# Patient Record
Sex: FEMALE | Employment: OTHER | URBAN - METROPOLITAN AREA
[De-identification: names, ages, dates, MRNs, and addresses within clinical notes are randomized per-mention and may not be internally consistent; named-entity substitution may affect disease eponyms.]

---

## 2023-11-15 LAB
NON-UH HIE A/G RATIO: 1.3
NON-UH HIE ALB: 3.8 G/DL (ref 3.4–5)
NON-UH HIE ALK PHOS: 78 UNIT/L (ref 45–117)
NON-UH HIE BASO COUNT: 0.06 X1000 (ref 0–0.2)
NON-UH HIE BASOS %: 1 %
NON-UH HIE BILIRUBIN, TOTAL: 0.5 MG/DL (ref 0.3–1.2)
NON-UH HIE BUN/CREAT RATIO: 22.5
NON-UH HIE BUN: 18 MG/DL (ref 9–23)
NON-UH HIE CALCIUM: 9.4 MG/DL (ref 8.7–10.4)
NON-UH HIE CALCULATED LDL CHOLESTEROL: 82 MG/DL (ref 60–130)
NON-UH HIE CALCULATED OSMOLALITY: 283 MOSM/KG (ref 275–295)
NON-UH HIE CHLORIDE: 109 MMOL/L (ref 98–107)
NON-UH HIE CHOLESTEROL: 189 MG/DL (ref 100–200)
NON-UH HIE CO2, VENOUS: 30 MMOL/L (ref 20–31)
NON-UH HIE CREATININE: 0.8 MG/DL (ref 0.5–0.8)
NON-UH HIE DIFF?: NO
NON-UH HIE EOS COUNT: 0.18 X1000 (ref 0–0.5)
NON-UH HIE EOSIN %: 2.6 %
NON-UH HIE GFR AA: >60
NON-UH HIE GLOBULIN: 3 G/DL
NON-UH HIE GLOMERULAR FILTRATION RATE: >60 ML/MIN/1.73M?
NON-UH HIE GLUCOSE: 91 MG/DL (ref 74–106)
NON-UH HIE GOT: 22 UNIT/L (ref 15–37)
NON-UH HIE GPT: 14 UNIT/L (ref 10–49)
NON-UH HIE HCT: 40.6 % (ref 36–46)
NON-UH HIE HDL CHOLESTEROL: 91 MG/DL (ref 40–60)
NON-UH HIE HGB A1C: 5.2 %
NON-UH HIE HGB: 13.6 G/DL (ref 12–16)
NON-UH HIE INSTR WBC: 6.6
NON-UH HIE K: 4.9 MMOL/L (ref 3.5–5.1)
NON-UH HIE LYMPH %: 23.6 %
NON-UH HIE LYMPH COUNT: 1.57 X1000 (ref 1.2–4.8)
NON-UH HIE MCH: 29.2 PG (ref 27–34)
NON-UH HIE MCHC: 33.4 G/DL (ref 32–37)
NON-UH HIE MCV: 87.4 FL (ref 80–100)
NON-UH HIE MONO %: 9.8 %
NON-UH HIE MONO COUNT: 0.65 X1000 (ref 0.1–1)
NON-UH HIE MPV: 8.3 FL (ref 7.4–10.4)
NON-UH HIE NA: 141 MMOL/L (ref 135–145)
NON-UH HIE NEUTROPHIL %: 63.1 %
NON-UH HIE NEUTROPHIL COUNT (ANC): 4.19 X1000 (ref 1.4–8.8)
NON-UH HIE NUCLEATED RBC: 0 /100WBC
NON-UH HIE PLATELET: 292 X10 (ref 150–450)
NON-UH HIE RBC: 4.65 X10 (ref 4.2–5.4)
NON-UH HIE RDW: 13.7 % (ref 11.5–14.5)
NON-UH HIE TOTAL CHOL/HDL CHOL RATIO: 2.1
NON-UH HIE TOTAL PROTEIN: 6.8 G/DL (ref 5.7–8.2)
NON-UH HIE TRIGLYCERIDES: 80 MG/DL (ref 30–150)
NON-UH HIE TSH: 2.78 UIU/ML (ref 0.55–4.78)
NON-UH HIE VIT D 25: 31 NG/ML
NON-UH HIE WBC: 6.6 X10 (ref 4.5–11)

## 2023-11-17 PROBLEM — Q44.1 GALLBLADDER ANOMALY: Status: ACTIVE | Noted: 2023-11-17

## 2023-11-17 PROBLEM — E03.9 HYPOTHYROIDISM: Status: ACTIVE | Noted: 2023-11-17

## 2023-11-17 PROBLEM — R01.1 MURMUR: Status: ACTIVE | Noted: 2023-11-17

## 2023-11-17 PROBLEM — E78.5 HYPERLIPIDEMIA: Status: ACTIVE | Noted: 2023-11-17

## 2023-11-17 PROBLEM — R06.83 SNORING: Status: ACTIVE | Noted: 2023-11-17

## 2023-11-17 PROBLEM — D23.61 OTHER BENIGN NEOPLASM OF SKIN OF RIGHT UPPER LIMB, INCLUDING SHOULDER: Status: ACTIVE | Noted: 2022-09-27

## 2023-11-17 PROBLEM — G47.30 SLEEP APNEA: Status: ACTIVE | Noted: 2023-11-17

## 2023-11-17 PROBLEM — R00.1 BRADYCARDIA: Status: ACTIVE | Noted: 2023-11-17

## 2023-11-17 PROBLEM — E66.9 OBESITY: Status: ACTIVE | Noted: 2023-11-17

## 2023-11-17 PROBLEM — M19.90 OSTEOARTHRITIS: Status: ACTIVE | Noted: 2023-11-17

## 2023-11-17 PROBLEM — E88.810 METABOLIC SYNDROME X: Status: ACTIVE | Noted: 2023-11-17

## 2023-11-17 PROBLEM — K21.9 GERD (GASTROESOPHAGEAL REFLUX DISEASE): Status: ACTIVE | Noted: 2023-11-17

## 2023-11-17 PROBLEM — L30.9 DERMATITIS, UNSPECIFIED: Status: ACTIVE | Noted: 2022-09-27

## 2023-11-17 PROBLEM — E55.9 VITAMIN D DEFICIENCY: Status: ACTIVE | Noted: 2023-11-17

## 2023-11-17 RX ORDER — TRIAMCINOLONE ACETONIDE 1 MG/G
1 CREAM TOPICAL 2 TIMES DAILY
COMMUNITY
Start: 2022-09-27 | End: 2023-11-21 | Stop reason: ALTCHOICE

## 2023-11-17 RX ORDER — LEVOTHYROXINE SODIUM 50 UG/1
1 TABLET ORAL DAILY
COMMUNITY

## 2023-11-17 RX ORDER — VIT C/E/ZN/COPPR/LUTEIN/ZEAXAN 250MG-90MG
25 CAPSULE ORAL DAILY
COMMUNITY

## 2023-11-17 RX ORDER — PANTOPRAZOLE SODIUM 40 MG/1
1 TABLET, DELAYED RELEASE ORAL DAILY
COMMUNITY

## 2023-11-17 RX ORDER — ROSUVASTATIN CALCIUM 5 MG/1
1 TABLET, COATED ORAL NIGHTLY
COMMUNITY
Start: 2022-04-05

## 2023-11-21 ENCOUNTER — OFFICE VISIT (OUTPATIENT)
Dept: PRIMARY CARE | Facility: CLINIC | Age: 73
End: 2023-11-21
Payer: MEDICARE

## 2023-11-21 VITALS
DIASTOLIC BLOOD PRESSURE: 90 MMHG | TEMPERATURE: 96.4 F | HEART RATE: 71 BPM | BODY MASS INDEX: 37.03 KG/M2 | WEIGHT: 209 LBS | HEIGHT: 63 IN | SYSTOLIC BLOOD PRESSURE: 166 MMHG

## 2023-11-21 DIAGNOSIS — Z12.31 SCREENING MAMMOGRAM FOR BREAST CANCER: ICD-10-CM

## 2023-11-21 DIAGNOSIS — E88.810 METABOLIC SYNDROME X: ICD-10-CM

## 2023-11-21 DIAGNOSIS — E78.2 MIXED HYPERLIPIDEMIA: Primary | ICD-10-CM

## 2023-11-21 DIAGNOSIS — I35.1 AORTIC EJECTION MURMUR: ICD-10-CM

## 2023-11-21 DIAGNOSIS — E55.9 VITAMIN D DEFICIENCY: ICD-10-CM

## 2023-11-21 DIAGNOSIS — E03.9 HYPOTHYROIDISM, UNSPECIFIED TYPE: ICD-10-CM

## 2023-11-21 PROCEDURE — 1036F TOBACCO NON-USER: CPT | Performed by: INTERNAL MEDICINE

## 2023-11-21 PROCEDURE — 1159F MED LIST DOCD IN RCRD: CPT | Performed by: INTERNAL MEDICINE

## 2023-11-21 PROCEDURE — 90662 IIV NO PRSV INCREASED AG IM: CPT | Performed by: INTERNAL MEDICINE

## 2023-11-21 PROCEDURE — 99214 OFFICE O/P EST MOD 30 MIN: CPT | Performed by: INTERNAL MEDICINE

## 2023-11-21 PROCEDURE — 1160F RVW MEDS BY RX/DR IN RCRD: CPT | Performed by: INTERNAL MEDICINE

## 2023-11-21 PROCEDURE — G0008 ADMIN INFLUENZA VIRUS VAC: HCPCS | Performed by: INTERNAL MEDICINE

## 2023-11-21 ASSESSMENT — PATIENT HEALTH QUESTIONNAIRE - PHQ9
2. FEELING DOWN, DEPRESSED OR HOPELESS: NOT AT ALL
1. LITTLE INTEREST OR PLEASURE IN DOING THINGS: NOT AT ALL
SUM OF ALL RESPONSES TO PHQ9 QUESTIONS 1 AND 2: 0

## 2023-11-21 NOTE — PROGRESS NOTES
Assessment/Plan   73 years old female who has chronic medical problems as in the active problem list came for a follow-up visit.  Her lab tests were reviewed and discussed and the acceptable.  She will continue the current medications.    Patient has ejection systolic murmur conducted to the neck indicated of possible arctic murmur.  She is asymptomatic at this time.  She has had previous calcium score done.  Will have a 2D echocardiogram done and I talked to her on the phone the importance of doing the 2D echocardiogram since she has not had a in the past she verbalizes understanding and she will call the office to make the appointment.    Patient's other chronic problems are stable.    If her echocardiogram is acceptable patient will follow-up with me in 6 months time with a blood test as ordered.      Problem List Items Addressed This Visit       Hyperlipidemia - Primary    Relevant Orders    CBC and Auto Differential    Comprehensive Metabolic Panel    Lipid Panel    Hypothyroidism    Relevant Orders    CBC and Auto Differential    Comprehensive Metabolic Panel    Lipid Panel    TSH with reflex to Free T4 if abnormal    Metabolic syndrome X    Relevant Orders    CBC and Auto Differential    Comprehensive Metabolic Panel    TSH with reflex to Free T4 if abnormal    Vitamin D deficiency    Relevant Orders    Vitamin D 25-Hydroxy,Total (for eval of Vitamin D levels)     Other Visit Diagnoses       Screening mammogram for breast cancer        Relevant Orders    BI mammo bilateral screening tomosynthesis    Aortic ejection murmur        Relevant Orders    Transthoracic Echo (TTE) Complete            Subjective     Patient ID: Trena Painting is a 73 y.o. female who presents for Follow-up (6 month follow up. ).    History of present illness  Patient came for a follow-up visit accompanied by her .  She was being to [facility] and stayed there for about few months [place].  She admits that she has  gained some weight.  She is planning to lose weight.  She checks her blood pressure at home and she believes it is well-controlled.    She is tolerating her medications well.  She denies any abdominal pain no fever no chills.    Rest of the review of systems no acute complaints.  ROS    Family History   Problem Relation Name Age of Onset    Heart attack Mother      Other (cerrbrovascular accident) Father        Social History     Socioeconomic History    Marital status:      Spouse name: Not on file    Number of children: Not on file    Years of education: Not on file    Highest education level: Not on file   Occupational History    Not on file   Tobacco Use    Smoking status: Former     Types: Cigarettes    Smokeless tobacco: Never   Substance and Sexual Activity    Alcohol use: Yes     Comment: rare    Drug use: Never    Sexual activity: Not on file   Other Topics Concern    Not on file   Social History Narrative    Not on file     Social Determinants of Health     Financial Resource Strain: Not on file   Food Insecurity: Not on file   Transportation Needs: Not on file   Physical Activity: Not on file   Stress: Not on file   Social Connections: Not on file   Intimate Partner Violence: Not on file   Housing Stability: Not on file      Patient has no known allergies.   Current Outpatient Medications   Medication Sig Dispense Refill    cholecalciferol (Vitamin D-3) 25 MCG (1000 UT) capsule Take 1 capsule (25 mcg) by mouth once daily.      levothyroxine (Synthroid, Levoxyl) 50 mcg tablet Take 1 tablet (50 mcg) by mouth once daily.      pantoprazole (ProtoNix) 40 mg EC tablet Take 1 tablet (40 mg) by mouth once daily.      rosuvastatin (Crestor) 5 mg tablet Take 1 tablet (5 mg) by mouth once daily at bedtime.       No current facility-administered medications for this visit.        Objective     Vitals:    11/21/23 1201   BP: 166/90   Pulse: 71   Temp: 35.8 °C (96.4 °F)        Physical Exam  well-nourished with  no apparent distress. Alert oriented  Skin: Normal turgor. No rash.  Head: Normocephalic, atraumatic.  Eyes: Pupils are equal, round,.  No pallor of conjunctivae.  Mucous membranes are moist.  Neck: Supple. No JVD. No carotid bruit. No thyromegaly. No cervical lymphadenopathy.  No clubbing, multiple peripheral osteoarthritis noted. Heberden's nodes noted.  Chest: Vesicular breathing Bilaterally good air entry. No wheezing. No crackles.  Heart: Regular rate and rhythm,S1, S2 positive.  Has a systolic murmur on the aortic area conducted to the neck.  Abdomen: Soft and nontender. Bowel sounds are positive. No organomegaly.  Extremities: Bilaterally no pedal pitting edema. Bilaterally 2+ dorsalis pedis pulses.  No calf tenderness. Homans sign is negative.  Neuro Exam: No focal signs. Gait is normal.       Problem List Items Addressed This Visit       Hyperlipidemia - Primary    Relevant Orders    CBC and Auto Differential    Comprehensive Metabolic Panel    Lipid Panel    Hypothyroidism    Relevant Orders    CBC and Auto Differential    Comprehensive Metabolic Panel    Lipid Panel    TSH with reflex to Free T4 if abnormal    Metabolic syndrome X    Relevant Orders    CBC and Auto Differential    Comprehensive Metabolic Panel    TSH with reflex to Free T4 if abnormal    Vitamin D deficiency    Relevant Orders    Vitamin D 25-Hydroxy,Total (for eval of Vitamin D levels)     Other Visit Diagnoses       Screening mammogram for breast cancer        Relevant Orders    BI mammo bilateral screening tomosynthesis    Aortic ejection murmur        Relevant Orders    Transthoracic Echo (TTE) Complete             Orders Placed This Encounter   Procedures    BI mammo bilateral screening tomosynthesis     Standing Status:   Future     Standing Expiration Date:   1/21/2025     Order Specific Question:   Perform a breast ultrasound if clinically indicated by Radiologist?     Answer:   Yes     Order Specific Question:   Reason for exam:      Answer:   screening     Order Specific Question:   Radiologist to Determine Optimal Study     Answer:   Yes     Order Specific Question:   [facility]     Answer:   Immediate [1]     Order Specific Question:   Is this exam part of a Research Study? If Yes, link this order to the research study     Answer:   No    Flu vaccine, quadrivalent, high-dose, preservative free, age 65y+ (FLUZONE)    CBC and Auto Differential     Standing Status:   Future     Standing Expiration Date:   11/21/2024     Order Specific Question:   Release result to MyChart     Answer:   Immediate    Comprehensive Metabolic Panel     Standing Status:   Future     Standing Expiration Date:   11/21/2024     Order Specific Question:   Release result to MyChart     Answer:   Immediate    Lipid Panel     fasting     Standing Status:   Future     Standing Expiration Date:   5/21/2024     Order Specific Question:   Release result to MyChart     Answer:   Immediate [1]    TSH with reflex to Free T4 if abnormal     Standing Status:   Future     Standing Expiration Date:   11/21/2024     Order Specific Question:   Release result to MyChart     Answer:   Immediate    Vitamin D 25-Hydroxy,Total (for eval of Vitamin D levels)     Standing Status:   Future     Standing Expiration Date:   11/21/2024     Order Specific Question:   Release result to MyChart     Answer:   Immediate    Transthoracic Echo (TTE) Complete     Standing Status:   Future     Standing Expiration Date:   11/21/2025     Order Specific Question:   Reason for exam:     Answer:   murmur     Order Specific Question:   Possible 3D echo?     Answer:   No     Order Specific Question:   Possible strain echo?     Answer:   No     Order Specific Question:   Where is your preferred location to perform this study?     Answer:   First Available        Lab Results   Component Value Date    WBC 6.9 08/29/2022    HGB 12.8 08/29/2022    HCT 40.9 08/29/2022     08/29/2022    CHOL 165  08/29/2022    TRIG 89 08/29/2022    HDL 75.8 08/29/2022    ALT 11 08/29/2022    AST 17 08/29/2022     08/29/2022    K 4.8 08/29/2022     08/29/2022    CREATININE 0.79 08/29/2022    BUN 18 08/29/2022    CO2 29 08/29/2022    TSH 3.54 08/29/2022    HGBA1C 5.2 11/11/2021     Lab Results   Component Value Date    CHOL 165 08/29/2022    CHHDL 2.2 08/29/2022       No results found.

## 2024-01-12 ENCOUNTER — DOCUMENTATION (OUTPATIENT)
Dept: PRIMARY CARE | Facility: CLINIC | Age: 74
End: 2024-01-12
Payer: MEDICARE

## 2024-01-12 NOTE — PROGRESS NOTES
A 2D echocardiogram was ordered for the patient during the last visit and November 2023.  Patient when she was with the  says that she had the echocardiogram done in December 2023.  The results is not available in the chart even with the outside records which I could able to review.  Please check with the patient where she had the echocardiogram done and get the report to the chart ASAP.  Thank you

## 2024-04-18 DIAGNOSIS — K21.9 GASTROESOPHAGEAL REFLUX DISEASE, UNSPECIFIED WHETHER ESOPHAGITIS PRESENT: ICD-10-CM

## 2024-04-18 DIAGNOSIS — E78.2 MIXED HYPERLIPIDEMIA: ICD-10-CM

## 2024-04-18 RX ORDER — PANTOPRAZOLE SODIUM 40 MG/1
40 TABLET, DELAYED RELEASE ORAL DAILY
Qty: 90 TABLET | Refills: 1 | Status: CANCELLED | OUTPATIENT
Start: 2024-04-18 | End: 2025-04-18

## 2024-04-18 RX ORDER — ROSUVASTATIN CALCIUM 5 MG/1
5 TABLET, COATED ORAL NIGHTLY
Qty: 90 TABLET | Refills: 1 | Status: CANCELLED | OUTPATIENT
Start: 2024-04-18 | End: 2025-04-18

## 2024-05-14 ENCOUNTER — APPOINTMENT (OUTPATIENT)
Dept: PRIMARY CARE | Facility: CLINIC | Age: 74
End: 2024-05-14
Payer: MEDICARE

## 2024-05-24 ENCOUNTER — PATIENT OUTREACH (OUTPATIENT)
Dept: CARE COORDINATION | Facility: CLINIC | Age: 74
End: 2024-05-24
Payer: MEDICARE

## 2024-05-24 NOTE — PROGRESS NOTES
Date: 05/24/24    Dear Trena Painting    Our records indicate that you are due for the following appointment or test: Annual Wellness Visit    Please call our office at your earliest convenience. We can assist you with scheduling an appointment or test.  If you have already scheduled an appointment or have completed testing, please disregard this letter.    Sincerely,    Rosemary Garcia    [address]  [address] [place] [address]    Office Phone: [phone]  Patient Navigator: [phone]